# Patient Record
Sex: MALE | Race: WHITE
[De-identification: names, ages, dates, MRNs, and addresses within clinical notes are randomized per-mention and may not be internally consistent; named-entity substitution may affect disease eponyms.]

---

## 2019-06-02 ENCOUNTER — HOSPITAL ENCOUNTER (EMERGENCY)
Dept: HOSPITAL 92 - ERS | Age: 54
Discharge: HOME | End: 2019-06-02
Payer: COMMERCIAL

## 2019-06-02 DIAGNOSIS — E78.5: ICD-10-CM

## 2019-06-02 DIAGNOSIS — L03.114: Primary | ICD-10-CM

## 2019-06-02 DIAGNOSIS — Z79.899: ICD-10-CM

## 2019-06-02 DIAGNOSIS — I10: ICD-10-CM

## 2019-06-02 LAB
ALBUMIN SERPL BCG-MCNC: 4.3 G/DL (ref 3.5–5)
ALP SERPL-CCNC: 102 U/L (ref 40–150)
ALT SERPL W P-5'-P-CCNC: 33 U/L (ref 8–55)
ANION GAP SERPL CALC-SCNC: 13 MMOL/L (ref 10–20)
AST SERPL-CCNC: 21 U/L (ref 5–34)
BASOPHILS # BLD AUTO: 0 THOU/UL (ref 0–0.2)
BASOPHILS NFR BLD AUTO: 0.1 % (ref 0–1)
BILIRUB SERPL-MCNC: 0.7 MG/DL (ref 0.2–1.2)
BUN SERPL-MCNC: 12 MG/DL (ref 8.4–25.7)
CALCIUM SERPL-MCNC: 9.3 MG/DL (ref 7.8–10.44)
CHLORIDE SERPL-SCNC: 102 MMOL/L (ref 98–107)
CO2 SERPL-SCNC: 26 MMOL/L (ref 22–29)
CREAT CL PREDICTED SERPL C-G-VRATE: 0 ML/MIN (ref 70–130)
EOSINOPHIL # BLD AUTO: 0.4 THOU/UL (ref 0–0.7)
EOSINOPHIL NFR BLD AUTO: 3.9 % (ref 0–10)
GLOBULIN SER CALC-MCNC: 2.7 G/DL (ref 2.4–3.5)
GLUCOSE SERPL-MCNC: 101 MG/DL (ref 70–105)
HGB BLD-MCNC: 14.5 G/DL (ref 14–18)
LYMPHOCYTES # BLD: 1.1 THOU/UL (ref 1.2–3.4)
LYMPHOCYTES NFR BLD AUTO: 9.8 % (ref 21–51)
MCH RBC QN AUTO: 30.8 PG (ref 27–31)
MCV RBC AUTO: 89.6 FL (ref 78–98)
MONOCYTES # BLD AUTO: 0.9 THOU/UL (ref 0.11–0.59)
MONOCYTES NFR BLD AUTO: 8.1 % (ref 0–10)
NEUTROPHILS # BLD AUTO: 8.5 THOU/UL (ref 1.4–6.5)
NEUTROPHILS NFR BLD AUTO: 78.1 % (ref 42–75)
PLATELET # BLD AUTO: 298 THOU/UL (ref 130–400)
POTASSIUM SERPL-SCNC: 4 MMOL/L (ref 3.5–5.1)
RBC # BLD AUTO: 4.72 MILL/UL (ref 4.7–6.1)
SODIUM SERPL-SCNC: 137 MMOL/L (ref 136–145)
SP GR UR STRIP: 1.02 (ref 1–1.04)
WBC # BLD AUTO: 10.9 THOU/UL (ref 4.8–10.8)

## 2019-06-02 PROCEDURE — 81003 URINALYSIS AUTO W/O SCOPE: CPT

## 2019-06-02 PROCEDURE — 96374 THER/PROPH/DIAG INJ IV PUSH: CPT

## 2019-06-02 PROCEDURE — 36415 COLL VENOUS BLD VENIPUNCTURE: CPT

## 2019-06-02 PROCEDURE — 84484 ASSAY OF TROPONIN QUANT: CPT

## 2019-06-02 PROCEDURE — 85025 COMPLETE CBC W/AUTO DIFF WBC: CPT

## 2019-06-02 PROCEDURE — 94760 N-INVAS EAR/PLS OXIMETRY 1: CPT

## 2019-06-02 PROCEDURE — 71275 CT ANGIOGRAPHY CHEST: CPT

## 2019-06-02 PROCEDURE — 93005 ELECTROCARDIOGRAM TRACING: CPT

## 2019-06-02 PROCEDURE — 80053 COMPREHEN METABOLIC PANEL: CPT

## 2019-06-02 PROCEDURE — 87040 BLOOD CULTURE FOR BACTERIA: CPT

## 2019-06-02 PROCEDURE — 83605 ASSAY OF LACTIC ACID: CPT

## 2019-06-02 NOTE — CT
EXAM: CTA of the chest



HISTORY: Dyspnea and left arm swelling.



COMPARISON: None



TECHNIQUE: Multiple contiguous axial images were obtained a CTA of the chest with contrast per pulmon
reji embolism protocol. 3-D oblique MIP reformats and direct coronal reformats were performed.



FINDINGS:

HEART: Normal in size without focal cardiac abnormality.

PULMONARY ARTERIES: Normal in caliber without filling defects to suggest pulmonary emboli.

MEDIASTINUM: No hilar or mediastinal lymphadenopathy.

LUNGS: No focal infiltrates or suspicious masses. A calcified granuloma is seen in the left upper lob
e.

PLEURAL SPACE: No pleural effusion or pneumothorax.





CHEST WALL SOFT TISSUES: Unremarkable

VISUALIZED OSSEOUS STRUCTURES: Unremarkable

VISUALIZED SUBDIAPHRAGMATIC STRUCTURES: Unremarkable



IMPRESSION:

No evidence of pulmonary thromboembolism



Reported By: Zach Jimenez 

Electronically Signed:  6/2/2019 5:11 PM